# Patient Record
Sex: FEMALE | Race: BLACK OR AFRICAN AMERICAN | NOT HISPANIC OR LATINO | RURAL
[De-identification: names, ages, dates, MRNs, and addresses within clinical notes are randomized per-mention and may not be internally consistent; named-entity substitution may affect disease eponyms.]

---

## 2020-12-30 ENCOUNTER — HISTORICAL (OUTPATIENT)
Dept: ADMINISTRATIVE | Facility: HOSPITAL | Age: 30
End: 2020-12-30

## 2020-12-30 LAB
FLUAV AG UPPER RESP QL IA.RAPID: NEGATIVE
FLUBV AG UPPER RESP QL IA.RAPID: NEGATIVE
RAPID GROUP A STREP ANTIGEN: NEGATIVE
SARS-COV+SARS-COV-2 AG RESP QL IA.RAPID: POSITIVE

## 2023-04-24 ENCOUNTER — HOSPITAL ENCOUNTER (EMERGENCY)
Facility: HOSPITAL | Age: 33
Discharge: HOME | End: 2023-04-24

## 2023-04-24 VITALS
SYSTOLIC BLOOD PRESSURE: 144 MMHG | BODY MASS INDEX: 34.36 KG/M2 | TEMPERATURE: 98 F | RESPIRATION RATE: 16 BRPM | DIASTOLIC BLOOD PRESSURE: 95 MMHG | HEIGHT: 69 IN | OXYGEN SATURATION: 99 % | WEIGHT: 232 LBS | HEART RATE: 79 BPM

## 2023-04-24 DIAGNOSIS — N34.2 INFECTIVE URETHRITIS: Primary | ICD-10-CM

## 2023-04-24 LAB
BACTERIA #/AREA URNS HPF: ABNORMAL /HPF
BILIRUB UR QL STRIP: NEGATIVE
CLARITY UR: ABNORMAL
COLOR UR: YELLOW
GLUCOSE UR STRIP-MCNC: NEGATIVE MG/DL
HCG UR QL IA.RAPID: NEGATIVE
KETONES UR STRIP-SCNC: NEGATIVE MG/DL
LEUKOCYTE ESTERASE UR QL STRIP: ABNORMAL
NITRITE UR QL STRIP: POSITIVE
PH UR STRIP: 5.5 PH UNITS
PROT UR QL STRIP: >=300
RBC # UR STRIP: ABNORMAL /UL
RBC #/AREA URNS HPF: ABNORMAL /HPF
SP GR UR STRIP: 1.02
SQUAMOUS #/AREA URNS LPF: ABNORMAL /LPF
UROBILINOGEN UR STRIP-ACNC: 1 MG/DL
WBC #/AREA URNS HPF: ABNORMAL /HPF
YEAST #/AREA URNS HPF: ABNORMAL /HPF

## 2023-04-24 PROCEDURE — 96372 THER/PROPH/DIAG INJ SC/IM: CPT | Performed by: FAMILY MEDICINE

## 2023-04-24 PROCEDURE — 99285 EMERGENCY DEPT VISIT HI MDM: CPT | Mod: 25

## 2023-04-24 PROCEDURE — 81001 URINALYSIS AUTO W/SCOPE: CPT | Performed by: FAMILY MEDICINE

## 2023-04-24 PROCEDURE — 87086 URINE CULTURE/COLONY COUNT: CPT | Performed by: FAMILY MEDICINE

## 2023-04-24 PROCEDURE — 99284 PR EMERGENCY DEPT VISIT,LEVEL IV: ICD-10-PCS | Mod: ,,, | Performed by: FAMILY MEDICINE

## 2023-04-24 PROCEDURE — 63600175 PHARM REV CODE 636 W HCPCS: Performed by: FAMILY MEDICINE

## 2023-04-24 PROCEDURE — 87077 CULTURE AEROBIC IDENTIFY: CPT | Performed by: FAMILY MEDICINE

## 2023-04-24 PROCEDURE — 99284 EMERGENCY DEPT VISIT MOD MDM: CPT | Mod: ,,, | Performed by: FAMILY MEDICINE

## 2023-04-24 PROCEDURE — 81025 URINE PREGNANCY TEST: CPT | Performed by: FAMILY MEDICINE

## 2023-04-24 RX ORDER — FLUCONAZOLE 200 MG/1
200 TABLET ORAL DAILY
Qty: 3 TABLET | Refills: 0 | Status: SHIPPED | OUTPATIENT
Start: 2023-04-24 | End: 2023-04-27

## 2023-04-24 RX ORDER — BUPROPION HYDROCHLORIDE 150 MG/1
150 TABLET ORAL DAILY
COMMUNITY
End: 2023-06-08

## 2023-04-24 RX ORDER — KETOROLAC TROMETHAMINE 30 MG/ML
60 INJECTION, SOLUTION INTRAMUSCULAR; INTRAVENOUS
Status: COMPLETED | OUTPATIENT
Start: 2023-04-24 | End: 2023-04-24

## 2023-04-24 RX ORDER — SULFAMETHOXAZOLE AND TRIMETHOPRIM 800; 160 MG/1; MG/1
1 TABLET ORAL 2 TIMES DAILY
Qty: 14 TABLET | Refills: 0 | Status: SHIPPED | OUTPATIENT
Start: 2023-04-24 | End: 2023-05-01

## 2023-04-24 RX ORDER — KETOROLAC TROMETHAMINE 10 MG/1
10 TABLET, FILM COATED ORAL EVERY 6 HOURS
Qty: 14 TABLET | Refills: 0 | Status: SHIPPED | OUTPATIENT
Start: 2023-04-24 | End: 2023-05-08

## 2023-04-24 RX ADMIN — KETOROLAC TROMETHAMINE 60 MG: 30 INJECTION, SOLUTION INTRAMUSCULAR at 05:04

## 2023-04-24 NOTE — ED PROVIDER NOTES
Encounter Date: 4/24/2023       History     Chief Complaint   Patient presents with    Abdominal Pain     Pt c/o right-sided abdominal pain x 4 days - described as an intermittent, sharp pain. Pt reports h/o kidney stones, last one in January. Pt reports urinary urgency and cloudy urine. Pt reports nausea, denies vomiting or diarrhea.      Patient comes in with right-sided abdominal pain for 4 days this pain radiates from her right flank down into her lower pubic ramus area.  She is had a kidney stone just last January she reports this urinary urgency and cloudy urine was the same last time she reports she has nausea and no vomiting with or diarrhea      Review of patient's allergies indicates:   Allergen Reactions    Latex, natural rubber     Prednisone      Past Medical History:   Diagnosis Date    Kidney stones     Systemic lupus erythematosus, organ or system involvement unspecified      History reviewed. No pertinent surgical history.  History reviewed. No pertinent family history.  Social History     Tobacco Use    Smoking status: Some Days     Types: Vaping with nicotine    Smokeless tobacco: Never   Substance Use Topics    Alcohol use: Yes     Alcohol/week: 1.0 standard drink     Types: 1 Shots of liquor per week    Drug use: Yes     Types: Marijuana     Review of Systems   Constitutional: Negative.  Negative for fever.   HENT: Negative.  Negative for sore throat.    Eyes: Negative.    Respiratory: Negative.  Negative for shortness of breath.    Cardiovascular: Negative.  Negative for chest pain.   Gastrointestinal: Negative.  Negative for nausea.        Patient with abdominal pain and right flank pain with CVS tenderness   Endocrine: Negative.    Genitourinary: Negative.  Negative for dysuria.   Musculoskeletal: Negative.  Negative for back pain.   Skin: Negative.  Negative for rash.   Allergic/Immunologic: Negative.    Neurological: Negative.  Negative for weakness.   Hematological: Negative.  Does not  bruise/bleed easily.   Psychiatric/Behavioral: Negative.     All other systems reviewed and are negative.    Physical Exam     Initial Vitals [04/24/23 1708]   BP Pulse Resp Temp SpO2   (!) 144/95 79 16 98.2 °F (36.8 °C) 99 %      MAP       --         Physical Exam    Constitutional: She appears well-developed and well-nourished.   HENT:   Head: Normocephalic and atraumatic.   Right Ear: External ear normal.   Left Ear: External ear normal.   Nose: Nose normal.   Mouth/Throat: Oropharynx is clear and moist.   Eyes: Conjunctivae and EOM are normal. Pupils are equal, round, and reactive to light.   Neck: Neck supple.   Normal range of motion.  Cardiovascular:  Normal rate, regular rhythm, normal heart sounds and intact distal pulses.           Pulmonary/Chest: Breath sounds normal.   Abdominal: Abdomen is soft. Bowel sounds are normal.   Patient with CVAs tenderness on the right   Genitourinary:    Vagina and uterus normal.     Musculoskeletal:         General: Normal range of motion.      Cervical back: Normal range of motion and neck supple.     Neurological: She is alert and oriented to person, place, and time. She has normal strength and normal reflexes.   Skin: Skin is warm. Capillary refill takes less than 2 seconds.   Psychiatric: She has a normal mood and affect. Her behavior is normal. Judgment and thought content normal.       Medical Screening Exam   See Full Note    ED Course   Procedures  Labs Reviewed   URINALYSIS, REFLEX TO URINE CULTURE - Abnormal; Notable for the following components:       Result Value    Leukocytes, UA Large (*)     Nitrites, UA Positive (*)     Protein, UA >=300 (*)     Blood, UA Large (*)     All other components within normal limits   URINALYSIS, MICROSCOPIC - Abnormal; Notable for the following components:    WBC, UA Too Numerous To Count (*)     RBC, UA 25-50 (*)     Bacteria, UA Many (*)     Yeast, UA Rare (*)     Squamous Epithelial Cells, UA Few (*)     All other components  within normal limits   HCG QUALITATIVE URINE - Normal   CULTURE, URINE          Imaging Results              CT Renal Stone Study Abd Pelvis WO (In process)  Result time 04/24/23 18:05:14                     Medications   ketorolac injection 60 mg (60 mg Intramuscular Given 4/24/23 1354)     Medical Decision Making:   Initial Assessment:   Patient in with right-sided flank pain  --=-=  history of renal stone  Differential Diagnosis:   Urinary tract infection possible post renal stone  ED Management:  Will treat with Bactrim DS 1 p.o. b.i.d. and Toradol 10 mg  Follow-up with primary care provider                       Clinical Impression:   Final diagnoses:  [N34.2] Infective urethritis (Primary)        ED Disposition Condition    Discharge Stable          ED Prescriptions       Medication Sig Dispense Start Date End Date Auth. Provider    sulfamethoxazole-trimethoprim 800-160mg (BACTRIM DS) 800-160 mg Tab Take 1 tablet by mouth 2 (two) times daily. for 7 days 14 tablet 4/24/2023 5/1/2023 Jefe Hicks DO    fluconazole (DIFLUCAN) 200 MG Tab Take 1 tablet (200 mg total) by mouth once daily. for 3 days 3 tablet 4/24/2023 4/27/2023 Jefe Hicks DO    ketorolac (TORADOL) 10 mg tablet Take 1 tablet (10 mg total) by mouth every 6 (six) hours. for 14 days 14 tablet 4/24/2023 5/8/2023 Jefe Hicks DO          Follow-up Information    None          Jefe Hicks DO  04/24/23 3879

## 2023-04-27 ENCOUNTER — TELEPHONE (OUTPATIENT)
Dept: EMERGENCY MEDICINE | Facility: HOSPITAL | Age: 33
End: 2023-04-27

## 2023-04-27 LAB — UA COMPLETE W REFLEX CULTURE PNL UR: ABNORMAL

## 2023-04-27 NOTE — TELEPHONE ENCOUNTER
Called this patient to see how she was doing. Stated that she was better. She was informed of the mass on her liver. She stated that she already knew it and was in the process of trying to get an mri scheduled.  Informed of the importance of getting this matter checked out and she voiced understanding

## 2023-04-28 NOTE — ADDENDUM NOTE
Encounter addended by: Antonia Delcid on: 4/28/2023 12:24 PM   Actions taken: SmartForm saved, Flowsheet accepted

## 2023-05-03 NOTE — ED NOTES
"Provider request to speak with pt regarding condition. Provider requests if pt not feeling better to call in Macrobid 100mg BID x 5 days. Pt states "feels much better. " No meds called in. Instructed pt to follow up with PCP if symptoms return with vu.   "

## 2023-05-03 NOTE — ADDENDUM NOTE
Encounter addended by: Rosmery Caicedo RN on: 5/3/2023 4:26 PM   Actions taken: Narrator Event Log accessed, Event accepted in Narrator, Note filed in Narrator, Clinical Note Signed

## 2023-06-08 ENCOUNTER — HOSPITAL ENCOUNTER (EMERGENCY)
Facility: HOSPITAL | Age: 33
Discharge: HOME | End: 2023-06-08

## 2023-06-08 VITALS
HEART RATE: 99 BPM | DIASTOLIC BLOOD PRESSURE: 61 MMHG | SYSTOLIC BLOOD PRESSURE: 100 MMHG | HEIGHT: 69 IN | TEMPERATURE: 98 F | WEIGHT: 222 LBS | OXYGEN SATURATION: 98 % | RESPIRATION RATE: 18 BRPM | BODY MASS INDEX: 32.88 KG/M2

## 2023-06-08 DIAGNOSIS — R06.02 SHORTNESS OF BREATH: Primary | ICD-10-CM

## 2023-06-08 DIAGNOSIS — R53.83 FATIGUE, UNSPECIFIED TYPE: ICD-10-CM

## 2023-06-08 LAB
ALBUMIN SERPL BCP-MCNC: 3.9 G/DL (ref 3.5–5)
ALBUMIN/GLOB SERPL: 0.7 {RATIO}
ALP SERPL-CCNC: 69 U/L (ref 37–98)
ALT SERPL W P-5'-P-CCNC: 26 U/L (ref 13–56)
ANION GAP SERPL CALCULATED.3IONS-SCNC: 15 MMOL/L (ref 7–16)
AST SERPL W P-5'-P-CCNC: 19 U/L (ref 15–37)
BASOPHILS # BLD AUTO: 0.02 K/UL (ref 0–0.2)
BASOPHILS NFR BLD AUTO: 0.3 % (ref 0–1)
BILIRUB SERPL-MCNC: 1.5 MG/DL (ref ?–1.2)
BILIRUB UR QL STRIP: NEGATIVE
BUN SERPL-MCNC: 13 MG/DL (ref 7–18)
BUN/CREAT SERPL: 13 (ref 6–20)
CALCIUM SERPL-MCNC: 9.3 MG/DL (ref 8.5–10.1)
CHLORIDE SERPL-SCNC: 101 MMOL/L (ref 98–107)
CLARITY UR: CLEAR
CO2 SERPL-SCNC: 27 MMOL/L (ref 21–32)
COLOR UR: YELLOW
CREAT SERPL-MCNC: 1.02 MG/DL (ref 0.55–1.02)
D DIMER PPP FEU-MCNC: 0.54 ΜG/ML (ref 0–0.47)
DIFFERENTIAL METHOD BLD: ABNORMAL
EGFR (NO RACE VARIABLE) (RUSH/TITUS): 74 ML/MIN/1.73M2
EOSINOPHIL # BLD AUTO: 0.01 K/UL (ref 0–0.5)
EOSINOPHIL NFR BLD AUTO: 0.2 % (ref 1–4)
ERYTHROCYTE [DISTWIDTH] IN BLOOD BY AUTOMATED COUNT: 13.6 % (ref 11.5–14.5)
GLOBULIN SER-MCNC: 5.3 G/DL (ref 2–4)
GLUCOSE SERPL-MCNC: 91 MG/DL (ref 74–106)
GLUCOSE SERPL-MCNC: 97 MG/DL (ref 70–105)
GLUCOSE UR STRIP-MCNC: NEGATIVE MG/DL
HCT VFR BLD AUTO: 40.7 % (ref 38–47)
HGB BLD-MCNC: 13.8 G/DL (ref 12–16)
KETONES UR STRIP-SCNC: ABNORMAL MG/DL
LEUKOCYTE ESTERASE UR QL STRIP: NEGATIVE
LYMPHOCYTES # BLD AUTO: 2.1 K/UL (ref 1–4.8)
LYMPHOCYTES NFR BLD AUTO: 35.4 % (ref 27–41)
MAGNESIUM SERPL-MCNC: 1.9 MG/DL (ref 1.7–2.3)
MCH RBC QN AUTO: 31.1 PG (ref 27–31)
MCHC RBC AUTO-ENTMCNC: 33.9 G/DL (ref 32–36)
MCV RBC AUTO: 91.7 FL (ref 80–96)
MONOCYTES # BLD AUTO: 0.58 K/UL (ref 0–0.8)
MONOCYTES NFR BLD AUTO: 9.8 % (ref 2–6)
MPC BLD CALC-MCNC: 12.3 FL (ref 9.4–12.4)
NEUTROPHILS # BLD AUTO: 3.23 K/UL (ref 1.8–7.7)
NEUTROPHILS NFR BLD AUTO: 54.3 % (ref 53–65)
NITRITE UR QL STRIP: NEGATIVE
PH UR STRIP: 5.5 PH UNITS
PLATELET # BLD AUTO: 300 K/UL (ref 150–400)
POTASSIUM SERPL-SCNC: 3.7 MMOL/L (ref 3.5–5.1)
PROT SERPL-MCNC: 9.2 G/DL (ref 6.4–8.2)
PROT UR QL STRIP: ABNORMAL
RBC # BLD AUTO: 4.44 M/UL (ref 4.2–5.4)
RBC # UR STRIP: NEGATIVE /UL
SODIUM SERPL-SCNC: 139 MMOL/L (ref 136–145)
SP GR UR STRIP: >=1.03
TROPONIN I SERPL DL<=0.01 NG/ML-MCNC: 16.1 PG/ML
UROBILINOGEN UR STRIP-ACNC: 0.2 MG/DL
WBC # BLD AUTO: 5.94 K/UL (ref 4.5–11)

## 2023-06-08 PROCEDURE — 25500020 PHARM REV CODE 255: Performed by: NURSE PRACTITIONER

## 2023-06-08 PROCEDURE — 85025 COMPLETE CBC W/AUTO DIFF WBC: CPT | Performed by: NURSE PRACTITIONER

## 2023-06-08 PROCEDURE — 93005 ELECTROCARDIOGRAM TRACING: CPT

## 2023-06-08 PROCEDURE — 99284 PR EMERGENCY DEPT VISIT,LEVEL IV: ICD-10-PCS | Mod: ,,, | Performed by: NURSE PRACTITIONER

## 2023-06-08 PROCEDURE — 99285 EMERGENCY DEPT VISIT HI MDM: CPT | Mod: 25

## 2023-06-08 PROCEDURE — 84484 ASSAY OF TROPONIN QUANT: CPT | Performed by: NURSE PRACTITIONER

## 2023-06-08 PROCEDURE — 93010 ELECTROCARDIOGRAM REPORT: CPT | Mod: ,,, | Performed by: FAMILY MEDICINE

## 2023-06-08 PROCEDURE — 80053 COMPREHEN METABOLIC PANEL: CPT | Performed by: NURSE PRACTITIONER

## 2023-06-08 PROCEDURE — 99284 EMERGENCY DEPT VISIT MOD MDM: CPT | Mod: ,,, | Performed by: NURSE PRACTITIONER

## 2023-06-08 PROCEDURE — 93010 EKG 12-LEAD: ICD-10-PCS | Mod: ,,, | Performed by: FAMILY MEDICINE

## 2023-06-08 PROCEDURE — 85379 FIBRIN DEGRADATION QUANT: CPT | Performed by: NURSE PRACTITIONER

## 2023-06-08 PROCEDURE — 83735 ASSAY OF MAGNESIUM: CPT | Performed by: NURSE PRACTITIONER

## 2023-06-08 PROCEDURE — 81003 URINALYSIS AUTO W/O SCOPE: CPT | Performed by: NURSE PRACTITIONER

## 2023-06-08 PROCEDURE — 82962 GLUCOSE BLOOD TEST: CPT

## 2023-06-08 RX ADMIN — IOPAMIDOL 100 ML: 755 INJECTION, SOLUTION INTRAVENOUS at 08:06

## 2023-06-08 NOTE — ED PROVIDER NOTES
Encounter Date: 6/8/2023       History     Chief Complaint   Patient presents with    Weakness     Weakness and confusion for 3 days      Mylene Haque is a 33 y.o. Black or  /female presenting to ED with multiple fatigue complaints of weakness, episodes of forgetfulness, decreased appetite, and feeling short winded with exertion for 3-4 days. States that she was recently treated for UTI and kidney stones and just completed course of PO abx yesterday. Denies any recent trauma or injury. Does commute approx 1-2 hours for work each day. She is AAOx3 and without confusion at this time. Slightly anxious but currently in NAD. Hypertensive and tachycardic. Otherwise, VSS at this time.      The history is provided by the patient.   Review of patient's allergies indicates:   Allergen Reactions    Latex, natural rubber     Prednisone      Past Medical History:   Diagnosis Date    Kidney stones     Systemic lupus erythematosus, organ or system involvement unspecified      History reviewed. No pertinent surgical history.  History reviewed. No pertinent family history.  Social History     Tobacco Use    Smoking status: Some Days     Types: Vaping with nicotine    Smokeless tobacco: Never   Substance Use Topics    Alcohol use: Yes     Alcohol/week: 1.0 standard drink     Types: 1 Shots of liquor per week    Drug use: Yes     Types: Marijuana     Review of Systems   Constitutional:  Positive for appetite change. Negative for activity change, chills, diaphoresis, fatigue and fever.   HENT:  Negative for congestion, ear pain, postnasal drip, rhinorrhea, sinus pressure, sinus pain and sneezing.    Eyes:  Negative for visual disturbance.   Respiratory:  Positive for chest tightness and shortness of breath. Negative for cough and wheezing.    Cardiovascular:  Negative for chest pain and palpitations.   Gastrointestinal:  Negative for abdominal pain, diarrhea, nausea and vomiting.   Genitourinary:  Negative for dysuria,  flank pain, frequency and urgency.   Musculoskeletal:  Negative for arthralgias, myalgias, neck pain and neck stiffness.   Skin:  Negative for color change and pallor.   Neurological:  Positive for weakness (generalized). Negative for dizziness, syncope, facial asymmetry, speech difficulty, light-headedness, numbness and headaches.   Psychiatric/Behavioral:  Positive for confusion. The patient is nervous/anxious.    All other systems reviewed and are negative.    Physical Exam     Initial Vitals [06/08/23 1827]   BP Pulse Resp Temp SpO2   (!) 156/107 (!) 113 18 98.1 °F (36.7 °C) 99 %      MAP       --         Physical Exam    Nursing note and vitals reviewed.  Constitutional: She appears well-developed and well-nourished. She is not diaphoretic. No distress.   HENT:   Head: Normocephalic and atraumatic.   Mouth/Throat: Oropharynx is clear and moist.   Eyes: Conjunctivae and EOM are normal. Pupils are equal, round, and reactive to light.   Neck: Neck supple.   Normal range of motion.  Cardiovascular:  Normal rate, regular rhythm, normal heart sounds and intact distal pulses.           Pulmonary/Chest: Breath sounds normal. No respiratory distress. She exhibits no tenderness.   Abdominal: Abdomen is soft. Bowel sounds are normal. She exhibits no distension. There is no abdominal tenderness. There is no rebound and no guarding.   Musculoskeletal:         General: No tenderness or edema. Normal range of motion.      Cervical back: Normal range of motion and neck supple.     Neurological: She is alert and oriented to person, place, and time. She has normal strength. No cranial nerve deficit or sensory deficit. She displays a negative Romberg sign. GCS score is 15. GCS eye subscore is 4. GCS verbal subscore is 5. GCS motor subscore is 6.   Skin: Skin is warm and dry. Capillary refill takes less than 2 seconds.   Psychiatric: Her mood appears anxious.       Medical Screening Exam   See Full Note    ED Course    Procedures  Labs Reviewed   COMPREHENSIVE METABOLIC PANEL - Abnormal; Notable for the following components:       Result Value    Total Protein 9.2 (*)     Globulin 5.3 (*)     Bilirubin, Total 1.5 (*)     All other components within normal limits   D DIMER, QUANTITATIVE - Abnormal; Notable for the following components:    D-Dimer 0.54 (*)     All other components within normal limits   URINALYSIS, REFLEX TO URINE CULTURE - Abnormal; Notable for the following components:    Protein, UA Trace (*)     Specific Gravity, UA >=1.030 (*)     All other components within normal limits   CBC WITH DIFFERENTIAL - Abnormal; Notable for the following components:    MCH 31.1 (*)     Monocytes % 9.8 (*)     Eosinophils % 0.2 (*)     All other components within normal limits   TROPONIN I - Normal   MAGNESIUM - Normal   CBC W/ AUTO DIFFERENTIAL    Narrative:     The following orders were created for panel order CBC auto differential.  Procedure                               Abnormality         Status                     ---------                               -----------         ------                     CBC with Differential[[id number]]        Abnormal            Final result                 Please view results for these tests on the individual orders.   POCT GLUCOSE MONITORING CONTINUOUS     EKG Readings: (Independently Interpreted)   Rhythm: Normal Sinus Rhythm. Heart Rate: 97. Ectopy: No Ectopy.   ECG Results              EKG 12-lead (In process)  Result time 06/08/23 19:41:19      In process by Interface, Lab In [facility] (06/08/23 19:41:19)                   Narrative:    Test Reason : R06.02,    Vent. Rate : 097 BPM     Atrial Rate : 097 BPM     P-R Int : 122 ms          QRS Dur : 082 ms      QT Int : 348 ms       P-R-T Axes : 036 017 004 degrees     QTc Int : 441 ms    Normal sinus rhythm  Minimal voltage criteria for LVH, may be normal variant  Borderline Abnormal ECG  No previous ECGs available    Referred By: [email or web address]    SELF           Confirmed By:                                   Imaging Results              CTA Chest Non-Coronary (PE Studies) (Final result)  Result time 06/08/23 20:56:00      Final result by Jordan Mcdaniel II, MD (06/08/23 20:56:00)                   Impression:      No evidence of pulmonary thromboembolism or other acute process demonstrated.      Electronically signed by: Jordan Mcdaniel  Date:    06/08/2023  Time:    20:56               Narrative:    EXAMINATION:  CTA CHEST NON CORONARY (PE STUDIES)    CLINICAL HISTORY:  Pulmonary embolism (PE) suspected, positive D-dimer;    TECHNIQUE:  Axial CT imaging of the chest is performed with intravenous contrast. Contrast dose is 100 cc Isovue 370.    CT dose reduction technique used - Dose Rite and tube current modulation.    COMPARISON:  None available    FINDINGS:  No thrombus or other abnormality is identified in the pulmonary arteries or veins.  The pulmonary vessel caliber is within normal limits.  The heart, mediastinum and great vessels appear within normal limits.    Lung parenchyma shows no evidence of airspace disease or abnormal density.  No effusion or pneumothorax is present.                                       X-Ray Chest PA And Lateral (Final result)  Result time 06/08/23 19:03:21      Final result by Jordan Mcdaniel II, MD (06/08/23 19:03:21)                   Impression:      No evidence of cardiopulmonary disease.      Electronically signed by: Jordan Mcdaniel  Date:    06/08/2023  Time:    19:03               Narrative:    EXAMINATION:  XR CHEST PA AND LATERAL    CLINICAL HISTORY:  Shortness of breath    COMPARISON:  12 April 2018    TECHNIQUE:  XR CHEST PA AND LATERAL    FINDINGS:  The heart and mediastinum are normal in size and configuration.  The pulmonary vascularity is normal in caliber.  No lung infiltrates, effusions, pneumothorax or other abnormality is demonstrated.                                       Medications   iopamidoL  (ISOVUE-370) injection 100 mL (100 mLs Intravenous Given 6/8/23 2055)     Medical Decision Making:   Initial Assessment:   Mylene Haque is a 33 y.o. Black or  /female presenting to ED with multiple fatigue complaints of weakness, episodes of forgetfulness, decreased appetite, and feeling short winded with exertion for 3-4 days. States that she was recently treated for UTI and kidney stones and just completed course of PO abx yesterday. Denies any recent trauma or injury. Does commute approx 1-2 hours for work each day. She is AAOx3 and without confusion at this time. Slightly anxious but currently in NAD. Hypertensive and tachycardic. Otherwise, VSS at this time.    Differential Diagnosis:   UTI, PE, pneumonia, MI, tachycardia, weakness  Clinical Tests:   Lab Tests: Ordered and Reviewed       <> Summary of Lab: CBC- unremarkable  CMP- unremarkable  Magnesium- 1.9  Troponin- 16.2  D-dimer- 0.54  Accucheck- 97  Urinalysis- trace protein  XR chest PA/Lat- No evidence of cardiopulmonary disease  EKG- NSR @ 97 BPM  CT chest PE protocol- No evidence of pulmonary thromboembolism or other acute process demonstrated.  Radiological Study: Ordered and Reviewed  Medical Tests: Ordered and Reviewed  ED Management:  INT  PERC score- 1 for elevated HR  Patient reports to be feeling better. BP and HR have improved without treatment.   Given the large differential diagnosis for Mylene Haque, the decision making in this case is of high complexity.    After evaluating all of the data points in this case, the presentation of Mylene Haque is NOT consistent with Acute Coronary Syndrome (ACS) and/or myocardial ischemia, pulmonary embolism, aortic dissection; Borhaave's, significant arrythmia, pneumothorax, cardiac tamponade, or other emergent cardiopulmonary condition.    Further, the presentation of Mylene Haque is NOT consistent with pericarditis, myocarditis, cholecystitis, pancreatitis, mediastinitis, endocarditis, new  valvular disease.    Additionally, the presentation of Mylene Haque is NOT consistent with flail chest, cardiac contusion, ARDS, or significant intra-thoracic or intra-abdominal bleeding.    Similarly, this presentation is NOT consistent with pneumonia, sepsis, or pyelonephritis.    Strict return and follow-up precautions have been given by me personally to the patient/family/caregiver(s).    Data Reviewed/Counseling: I have reviewed the patient's vital signs, nursing notes, and other relevant tests/information. I had a detailed discussion regarding the historical points, exam findings, and any diagnostic results supporting the discharge diagnosis. I also discussed the need for outpatient follow-up and the need to return to the ED if symptoms worsen or if there are any questions or concerns that arise at home.     Dx: Fatigue, exertional dyspnea  Additional MDM:   PERC Rule:   Age is greater than or equal to 50 = 0.0  Heart Rate is greater than or equal to 100 = 1.0  SaO2 on room air < 95% = 0.0  Unilateral leg swelling = 0.0  Hemoptysis = 0.0  Recent surgery or trauma = 0.0  Prior PE or DVT =  0.0  Hormone use = 0.00  PERC Score = 1        ED Course as of 06/08/23 2105   Thu Jun 08, 2023 2022 Case discussed with Dr Saals. She is in agreement with CT PE protocol.  [LP]   2059 Discussed results. Patient is in agreement with plan to d/c home. V/u of all discharge instructions. No questions or concerns at this time. [LP]      ED Course User Index  [LP] GEORGE Bolton                Clinical Impression:   Final diagnoses:  [R06.02] Shortness of breath (Primary)  [R53.83] Fatigue, unspecified type        ED Disposition Condition    Discharge Stable          ED Prescriptions    None       Follow-up Information    None          GEORGE Bolton  06/08/23 2103       GEORGE Bolton  06/08/23 2105

## 2023-06-08 NOTE — ED NOTES
"Pt presents with generalized weakness, confusion, states "she just doesn't feel like herself for last 3 days" and gets easily winded. 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 Pt concerned and says shes had lung scarring since having covid, also being evaluated for benign liver mass and has medical history that includes a dx of lupus. Pt denies any issues with lupus in her history. Resp even et unlabored, pt anxious initially but calms upon interviewing and obtaining her history, -115 bpm. Pt BP elevated, pt states never been dx with htn, only when took diet med awhile back.   "

## 2023-06-09 NOTE — ED NOTES
Instructed patient to follow-up with pcp in 1-2 days for re-evaluation of BP & other symptoms.  Stop taking wellbutrin xl.  Take all other home medications as directed.  Drink plenty of fluids.  Return to ED for any new or worsening symptoms or emergencies.  Patient verbalized understanding of all instructions.

## 2023-06-09 NOTE — DISCHARGE INSTRUCTIONS
Follow up with primary care provider in 2-3 days for re-evaluation.   Return to emergency department for any new or worsening symptoms or for any future emergencies.